# Patient Record
Sex: MALE | URBAN - METROPOLITAN AREA
[De-identification: names, ages, dates, MRNs, and addresses within clinical notes are randomized per-mention and may not be internally consistent; named-entity substitution may affect disease eponyms.]

---

## 2022-12-19 ENCOUNTER — TELEPHONE (OUTPATIENT)
Dept: ENDOCRINOLOGY | Age: 72
End: 2022-12-19

## 2023-01-04 ENCOUNTER — TELEPHONE (OUTPATIENT)
Dept: ENDOCRINOLOGY | Age: 73
End: 2023-01-04

## 2023-01-04 RX ORDER — GABAPENTIN 600 MG/1
TABLET ORAL
COMMUNITY
Start: 2022-11-13

## 2023-01-04 RX ORDER — PREGABALIN 150 MG/1
CAPSULE ORAL
COMMUNITY
Start: 2022-12-14

## 2023-01-04 RX ORDER — ESCITALOPRAM OXALATE 20 MG/1
TABLET ORAL
COMMUNITY
Start: 2022-11-27

## 2023-01-04 RX ORDER — TAMSULOSIN HYDROCHLORIDE 0.4 MG/1
CAPSULE ORAL
COMMUNITY
Start: 2022-10-15

## 2023-03-15 ENCOUNTER — OFFICE VISIT (OUTPATIENT)
Dept: ENDOCRINOLOGY | Age: 73
End: 2023-03-15
Payer: MEDICARE

## 2023-03-15 VITALS
DIASTOLIC BLOOD PRESSURE: 76 MMHG | WEIGHT: 167 LBS | SYSTOLIC BLOOD PRESSURE: 134 MMHG | HEIGHT: 69 IN | BODY MASS INDEX: 24.73 KG/M2

## 2023-03-15 DIAGNOSIS — M81.0 AGE-RELATED OSTEOPOROSIS WITHOUT CURRENT PATHOLOGICAL FRACTURE: Primary | ICD-10-CM

## 2023-03-15 DIAGNOSIS — C79.51 PROSTATE CANCER METASTATIC TO BONE (HCC): ICD-10-CM

## 2023-03-15 DIAGNOSIS — E67.3 HYPERVITAMINOSIS D: ICD-10-CM

## 2023-03-15 DIAGNOSIS — Z51.81 MEDICATION MONITORING ENCOUNTER: ICD-10-CM

## 2023-03-15 DIAGNOSIS — C61 PROSTATE CANCER METASTATIC TO BONE (HCC): ICD-10-CM

## 2023-03-15 PROCEDURE — 1123F ACP DISCUSS/DSCN MKR DOCD: CPT | Performed by: INTERNAL MEDICINE

## 2023-03-15 PROCEDURE — 99205 OFFICE O/P NEW HI 60 MIN: CPT | Performed by: INTERNAL MEDICINE

## 2023-03-15 RX ORDER — ALUMINUM HYDROXIDE, MAGNESIUM HYDROXIDE, DIMETHICONE 400; 400; 40 MG/5ML; MG/5ML; MG/5ML
1 LIQUID ORAL 2 TIMES DAILY
COMMUNITY
Start: 2022-09-20

## 2023-03-15 RX ORDER — PYRIDOXINE HCL (VITAMIN B6) 50 MG
TABLET ORAL DAILY
COMMUNITY

## 2023-03-15 RX ORDER — LORAZEPAM 0.5 MG/1
TABLET ORAL 2 TIMES DAILY PRN
COMMUNITY
Start: 2022-09-20

## 2023-03-15 RX ORDER — TRIAMCINOLONE ACETONIDE 1 MG/G
CREAM TOPICAL
COMMUNITY
Start: 2023-02-03

## 2023-03-15 RX ORDER — POLYETHYLENE GLYCOL 3350 17 G/17G
POWDER, FOR SOLUTION ORAL 2 TIMES DAILY
COMMUNITY
Start: 2016-11-07

## 2023-03-15 RX ORDER — IBUPROFEN 200 MG
TABLET ORAL
COMMUNITY

## 2023-03-15 RX ORDER — ACETAMINOPHEN 500 MG
TABLET ORAL EVERY 4 HOURS PRN
COMMUNITY

## 2023-03-15 NOTE — PROGRESS NOTES
[facility] ([facility]) Osteoporosis and [address] [facility], [address] [facility],[place] [address]  Phone [phone]  Fax [phone]    NAME:  Rae Blanc  :  1950  CONSULT DATE:    03/15/2023  MOST RECENT VISIT:  03/15/2023  TODAY'S DATE:  03/15/2023    Labs @  2022    CONSULTATION REQUESTED BY: Lucia Varghese MD  OTHERS WHO NEED REPORTS: Denise Lim MD    PROBLEMS. Osteoporosis by DXA 2022, T-scores -2.0 in the spine (L1-L2), -2.9 in the left femoral neck    Family history of osteoporosis, none  Fibromyalgia  Prostate cancer metastatic to bone, radiation, surgery, Lupron, apalutamide  Hypervitaminosis D, desirable 25-OH D is 30-60 ng/m/L    90 ng/mL 2023, range  ng/mL since 2020 with 10,000 IU daily    CURRENT MANAGEMENT FOR BONE HEALTH/OSTEOPOROSIS. Calcium, 300 mg from low calcium foods, 300 mg milk, 150 mg cheese, 100 mg dk green vegs   diet MVI Ca+D other    Calcium 850  500 x 2  mg/d   Vitamin D   200 x 2 10,000 IU/d   Exercise, nothing regular  Pharmacologic therapy: Xgeva (denosumab) 120 mg SQ monthly started 2023 (may change to Zometa Q 3 months due to cost)    PREVIOUS BONE-ACTIVE MEDICATIONS. none    OTHER CURRENT MEDICATIONS (SELECTED): escitalopram,   OTC MEDICATIONS (SELECTED): CoQ10, Culturelle, F58, garlic, Omega 3, magnesium, Miralax    CHIEF COMPLAINT. DXA scan shows osteoporosis    HISTORY OF PRESENT ILLNESS: See problem list for chronic/inactive conditions. Mr. Merrill Yee is a 78-year-old man who was found to have osteoporosis by DXA in 2022. He also has prostate cancer metastatic to bone and was started on Xgeva 2023. FOR FULL DETAILS OF FAMILY HISTORY, PAST MEDICAL AND SURGICAL HISTORY, SOCIAL HISTORY, AND REVIEW OF SYSTEMS, SEE PATIENT QUESTIONNAIRE OF TODAY'S DATE. FAMILY HISTORY. Relevant hx in problem list and/or HPI. Otherwise not contributory. PAST MEDICAL HISTORY. Noted in health history form. PAST SURGICAL HISTORY.   Noted in health history form. SOCIAL HISTORY. Nonsmoker. No excessive intake of alcohol, caffeine or sodas. Lives alone. REVIEW OF SYSTEMS. Maximum adult height 71. No significant height loss. Usual weight 150#. No recent significant change in weight. PHYSICAL EXAMINATION. GENERAL. Well-nourished, well-developed, normally proportioned adult. MENTAL STATUS. Pleasant mood. Oriented to time, place, and person. ORAL. Teeth appear to be in good condition. SKIN. Normal texture and turgor. LUNGS. Clear to auscultation. Breath sounds normal.  HEART. Heart sounds normal, no murmur or gallop. MUSCULOSKELETAL. The examination included inspection/palpation (any misalignment, asymmetry, crepitation, defects, tenderness, masses, effusions is noted), assessment of range of motion (any presence of pain, crepitation, contracture is noted), assessment of stability (any dislocation, subluxation, laxity is noted), assessment of muscle strength and tone (any atrophy or abnormal movements is noted). Pelvis appears normal.  Spinal contours are normal.  No spine tenderness to palpation or percussion. Three finger spaces between ribs and pelvis. Gait steady without assistance. NEUROLOGICAL. Able to rise from chair without using arms. No obvious motor or sensory deficit. Coordination appears normal     BONE DENSITY. Most recent done at [facility]. I deleted L3 and L4 due to T-score discrepancy. T-scores   Initial study: 09/19/2022* L1-L2 -2.0 left fem. neck -2.9     The table below shows bone mineral density (grams/cm2), the appropriate measure for comparing serial scans. A significant increase or decrease is based on precision studies done at our center according to the ISCD protocol with a least significant change of 0.030 g/cm2. PA spine Proximal Femur (left)   Date L1-L2 Fem. neck Trochanter Total hip   09/19/2022* 0.914 0.694 0.573 0.712   *Done with [facility] equipment.       Labs: 07/2022 Ca 9.3 Cr 1.2. Imaging: DXA printouts reviewed. ASSESSMENT. Osteoporosis, bone density lower than desirable. Lupron and apalutamide treatment for prostate cancer are known to cause accelerated bone loss and increase risk for fracture. Without effective therapy, risk of fracture is high. THERAPEUTIC PLANS. Calcium, target 1200 mg daily; we discussed recommended calcium intake and the effects of excessive calcium intake from supplements. I provided a handout with information about how to calculate daily calcium intake. Advised to reduce calcium supplement to 500 mg/d. Vitamin D, recommended stopping the 10,000 daily dose for a month then resuming 10,000 IU weekly (or 6115-9281 IU daily). Exercise, Recommended weight-bearing exercise (walking or equivalent) 30-40 minutes per session, 3 or 4 sessions a week. Pharmacologic therapy, Either Xgeva or Zometa should provide adequate protection. If for any reason, neither of those is needed, then either Prolia or Reclast should be considered. Return appointment if needed. Total time on the date the encounter was 60-74 minutes. Michael Santos MD, Director, [facility]) Osteoporosis and Bone Health Services    CC: Dillan Paulson MD

## 2023-03-15 NOTE — LETTER
[address] [facility]  [facility] [address] [facility], [address] [place] [address],[place] [address]  Phone [phone]  Fax [phone]       March 15, 2023        Shaye Valverde MD                            Re:  Naya Nelson,  1950    Dear Dr. Antonio Jaime: Thank you for asking me to see Naya Nelson in consultation. As you know, Mr. Raegan Llanes is a 75 y.o. man found to have osteoporosis 2022 (lowest T-score -2.9 in the left femoral neck) but also found to have prostate cancer metastatic to bone. Treatment for that is Xgeva (denosumab) 120 mg SQ monthly; he may need to change to Zometa (zoledronic acid) due to cost, but either of those should be more than sufficient pharmacologic treatment for osteoporosis. We reviewed life-style issues (calcium, vitamin D and physical activity). I recommended reducing his doses of calcium and vitamin D. I would be happy to see him again if needed but do not need to see him back unless he is not longer getting a bone-active drug to protect against skeletal-related events from prostate cancer. Enclosed is a copy of my consultation note. Please let me know if you have any questions. Sincerely,    Mark Perez. [email or web address]     Encl.  Copy of consult note      Ignacia Livignston MD

## 2023-12-12 ENCOUNTER — TELEPHONE (OUTPATIENT)
Dept: ENDOCRINOLOGY | Age: 73
End: 2023-12-12

## 2023-12-12 NOTE — TELEPHONE ENCOUNTER
Left message for the patient to call the office related to follow up and dexa with Dr Cole Murcia.     Office would like to know if there have been health issues since last visit with DR Cole Murcia, such as, fractures, spine or hip surgery

## 2024-12-04 ENCOUNTER — TELEPHONE (OUTPATIENT)
Dept: ENDOCRINOLOGY | Age: 74
End: 2024-12-04

## 2024-12-04 NOTE — TELEPHONE ENCOUNTER
Dr. Santos, patient is receiving zometa from oncologist Q3 months. Was getting it monthly but recently changed. Patient stated he recently got a dexa scan, ordered by his oncologist. He states he has no one to read it. Has been taking calcium oyster shell 2x daily.

## 2024-12-04 NOTE — TELEPHONE ENCOUNTER
I have not seen him since his initial visit 03/2023.    Zometa (or Xgeva) are treatments for prostate cancer metastatic to bone. The decision on those should be made by his urologist.    We give lower doses (Reclast and Prolia) for osteoporosis. If he is getting Zometa or Xgeva from his urologist, he does not need the lower dose in addition.  If he is NOT getting either Zometa or Xgeva, the either Reclast or Prolia would be indicated. To decide on that, I would need to see him and get an updated bone density test.

## 2024-12-05 NOTE — TELEPHONE ENCOUNTER
The bone density was done 09/23/2024 at [facility].  It has been interpreted by Libby FELDMAN. That report is in Epic for his oncologist or any other authorized provider to see.     If he is getting Zometa, that covers what I would be doing.  Happy to see him but don't think I have anything to add.